# Patient Record
Sex: FEMALE | Race: WHITE | URBAN - METROPOLITAN AREA
[De-identification: names, ages, dates, MRNs, and addresses within clinical notes are randomized per-mention and may not be internally consistent; named-entity substitution may affect disease eponyms.]

---

## 2023-03-29 ENCOUNTER — OFFICE VISIT (OUTPATIENT)
Facility: LOCATION | Age: 38
End: 2023-03-29
Payer: COMMERCIAL

## 2023-03-29 DIAGNOSIS — H16.223 KERATOCONJUNCT SICCA, NOT SPECIFIED AS SJOGREN'S, BILATERAL: ICD-10-CM

## 2023-03-29 DIAGNOSIS — H18.833 RECURRENT EROSION OF CORNEA, BILATERAL: Primary | ICD-10-CM

## 2023-03-29 DIAGNOSIS — H04.123 DRY EYE SYNDROME OF BILATERAL LACRIMAL GLANDS: ICD-10-CM

## 2023-03-29 PROCEDURE — 99213 OFFICE O/P EST LOW 20 MIN: CPT | Performed by: OPHTHALMOLOGY

## 2023-03-29 RX ORDER — OFLOXACIN 3 MG/ML
0.3 % SOLUTION/ DROPS OPHTHALMIC
Qty: 3 | Refills: 4 | Status: ACTIVE
Start: 2023-03-29

## 2023-03-29 RX ORDER — ERYTHROMYCIN 5 MG/G
OINTMENT OPHTHALMIC
Qty: 3.5 | Refills: 11 | Status: ACTIVE
Start: 2023-03-29

## 2023-03-29 RX ORDER — OFLOXACIN 3 MG/ML
0.3 % SOLUTION/ DROPS OPHTHALMIC
Qty: 10 | Refills: 3 | Status: ACTIVE
Start: 2023-03-29

## 2023-03-29 ASSESSMENT — INTRAOCULAR PRESSURE
OS: 14
OD: 14

## 2023-03-29 NOTE — IMPRESSION/PLAN
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 Plan: Continue Ofloxacin QID OU x 4 days. RX sent to pharmacy. Emphasized importance of starting and maintaining LINA tx to help prevent recurrence of condition. Pt expresses understanding. RTC in 3-4 weeks for re-evaluation with Dr. Guerline Rangel. If not enough improvement noted, consider starting Serum Tears. Patient to continue primary eye care with Dr. Lisa Caicedo. Referral given for patient to have in-office treatment for clogged oil glands. Advised patient not to work due to recovering from corneal erosion.

## 2023-03-29 NOTE — IMPRESSION/PLAN
Impression: Keratoconjunct sicca, not specified as Sjogren's, bilateral: E68.092. Plan: RTC in 1-2 weeks for ext x4 and Lipiview with Dr. Ashwini Lowe.

## 2023-03-29 NOTE — IMPRESSION/PLAN
Impression: Dry eye syndrome of bilateral lacrimal glands: H04.123. Plan: Start Oasis Tears Plus/PFATs q2h [place] OU, erythromycin robel qhs OU, hot compresses BID OU, and omega 3s. Emphasized importance of starting mindful blinking exercises. Recommended patient use electric dry eye mask BID OU. Patient advised to gently express their oil glands immediately following the use of the hot compresses with a cotton tip.